# Patient Record
Sex: FEMALE | Race: WHITE | URBAN - METROPOLITAN AREA
[De-identification: names, ages, dates, MRNs, and addresses within clinical notes are randomized per-mention and may not be internally consistent; named-entity substitution may affect disease eponyms.]

---

## 2019-08-31 ENCOUNTER — OFFICE VISIT (OUTPATIENT)
Dept: URGENT CARE | Facility: URGENT CARE | Age: 69
End: 2019-08-31
Payer: COMMERCIAL

## 2019-08-31 VITALS
DIASTOLIC BLOOD PRESSURE: 76 MMHG | BODY MASS INDEX: 22.05 KG/M2 | TEMPERATURE: 98.6 F | SYSTOLIC BLOOD PRESSURE: 130 MMHG | HEIGHT: 70 IN | HEART RATE: 74 BPM | WEIGHT: 154 LBS

## 2019-08-31 DIAGNOSIS — A69.20 ERYTHEMA MIGRANS (LYME DISEASE): Primary | ICD-10-CM

## 2019-08-31 PROCEDURE — 99203 OFFICE O/P NEW LOW 30 MIN: CPT | Performed by: FAMILY MEDICINE

## 2019-08-31 RX ORDER — DOXYCYCLINE HYCLATE 100 MG
TABLET ORAL
Qty: 2 TABLET | Refills: 0 | Status: SHIPPED | OUTPATIENT
Start: 2019-08-31

## 2019-08-31 RX ORDER — PHENYTOIN SODIUM 100 MG/1
CAPSULE, EXTENDED RELEASE ORAL
COMMUNITY
Start: 2019-06-03

## 2019-08-31 ASSESSMENT — MIFFLIN-ST. JEOR: SCORE: 1303.79

## 2019-08-31 NOTE — PATIENT INSTRUCTIONS
"  Patient Education     Tick Bites  Ticks are small arachnids that feed on the blood of rodents, rabbits, birds, deer, dogs, and people. A tick bite may cause a reaction like a spider bite. You may have redness, itching, and slight swelling at the site. Sometimes you may have no reaction where the tick bit you.  Ticks may gorge themselves for days before you find and remove them. The bites themselves aren't cause for concern. But ticks can carry and pass on illnesses such as Lyme disease and [place] spotted fever. Both diseases begin with a rash and symptoms similar to the flu. In advanced stages, these diseases can be quite serious.     A \"bull's eye\" rash is a common symptom of Lyme disease.   When to go to the emergency room (ER)  Not all ticks carry disease. And a tick must stay attached for at least 24 hours to infect you. If you find a tick, don't panic. Try to carefully remove it with tweezers. Grasp the tick near its head and pull without twisting. If you can't easily dislodge the tick or if you leave the head in your skin, get medical care right away.  What to expect in the ER    The tick or any parts of the tick will be removed and the bite will be cleaned.    To prevent disease, you may be given antibiotics. Both Lyme disease and [place] spotted fever respond quickly to these medicines.    You may be asked to see your healthcare provider for a blood test to check for Lyme disease.  Follow-up care  Some states and counties have services that test ticks for Lyme disease and other diseases. Check with your local officials to see if this service is available in your area.  If you remove a tick yourself, watch for signs of a tick-borne illness. Symptoms may show up within a few days or weeks after a bite. Call your healthcare provider if you notice any of the following:    Rash. The rash may spread outward in a ring from a hard white lump. Or it may move up your arms and legs to your " chest.    Chills and fever    Body aches and joint pain    Severe headache  Date Last Reviewed: 12/1/2016 2000-2018 [employer]. [address] [place] [address], [place] [address]. All rights reserved. This information is not intended as a substitute for professional medical care. Always follow your healthcare professional's instructions.           Patient Education     Preventing Lyme Disease  Ticks are small spider-like arachnids that feed on the blood of animals and humans. They can carry the bacteria that cause Lyme disease. The bacteria can pass to a person from a tick bite. (It is not passed from person to person.) Lyme disease can lead to serious health problems if it is not treated with antibiotics. The best way to prevent Lyme disease is to avoid being bitten by a tick.     The tick that carries Lyme disease is very small--about the size of a poppy seed.   Preventing tick bites  The disease is carried by the blacklegged tick, also called a  deer tick.  Young ticks called nymphs are the most likely to carry the bacteria. They are very small--about the size of a poppy seed. They can be found on deer, rodents, and birds. Often the animal brushes the tick onto leaves or other plants as it runs through the woods and then the tick lives in bushes, grasses, and dead leaves. The most active time of year for infected ticks varies by region of the country. In the [place] and [place], they are more active from April to September. In the [place], they may be more active from December to February. But you can still be bitten at other times of the year. Below are tips for protecting yourself from tick bites.  Protect your body    Wear clothes that protect you. Clothing can help protect you from tick bites. Wear long pants and long sleeves in outdoor areas where ticks may live. Tuck your shirt into your pants. Tuck pant legs into your socks. And wear light colors so you can more easily see ticks on your clothes.    Use  insect repellent. Spray insect repellent containing at least 20 percent DEET on your exposed skin. You can also use it on clothing, shoes, and camping gear. Avoid getting DEET on children s hands, mouth, or eyes. You can use products with permethrin on clothing, shoes, and camping gear. But do not spray permethrin on skin. It will cause a rash. Follow the directions on the package of the spray you use. For more information on bug sprays, visit the National Pesticide Information Center at http://npic.[facility].    Avoid tick-infested areas. Avoid brushing against grasses, bushes, and other plants. Avoid walking through dead leaves and other ground vegetation. Do not sit on fallen logs. And avoid areas with large numbers of deer and rodents.    Check yourself for ticks. After being outdoors, check your clothes and skin for ticks. Keep in mind that the ticks are about the size of a poppy seed. Use both a hand-held and a full-length mirror to view all of your skin. Pay special attention to areas with hair. Make sure to thoroughly check these areas:  ? Scalp  ? Behind the ears  ? Armpits  ? Belly button  ? Waist  ? Groin  ? Backs of the knees    Use the clothes dryer. Putting clothing or bedding into a clothes dryer for 1 hour at high heat has been shown to kill ticks.  Control ticks around your home    Create tick-free safety zones. Ticks that transmit Lyme disease live in ground vegetation. Ticks crawl onto people from shrubs and grasses in and around wooded areas. Cut bushes and other plants away from the deck or patio and any child play areas. Keep all grasses cut short. Remove dead leaves and other dead vegetation. Do not put children s play equipment near wooded areas. Put wood chips or gravel on the ground between lawns and wooded areas.    Use pesticides. You can apply them yourself or hire a pest control expert. States have different regulations about pesticides. Ask your local health department for  information.    Keep deer away. Deer often carry the ticks that can infect you with Lyme disease. Do not attempt to pet or feed deer. Ask your local [facility] about deer-resistant plants. Ask your local health department about deer control in your area.    Prevent ticks on pets. Pets can bring ticks into your home. Use tick control medicine as advised by your . Check your pet for ticks after it comes indoors. Pay special attention to the ears.    Ask about local tick-control methods. Some states have tick-control programs. Local health departments may be using methods that can help you control ticks at home.  If you have a tick  If you find a tick on your skin, do not panic. Most ticks don't carry Lyme disease. And the tick must be attached for 36 to 48 hours before it might infect you. If you find a tick on you, here s what to do:    If the tick is not yet attached to your skin, remove the tick with tweezers or a tissue. Flush it down the toilet. If you see a tick on your clothes, use a piece of tape to lift it off. Do not touch it with your bare hands.    If the tick is attached to your skin:  ? Carefully remove the tick with tweezers. If you don t have tweezers, use your fingers protected by a paper towel or a thin cloth. Grasp the tick as close to your skin as possible. Pull slowly and gently to remove the tick. The tick may not let go right away. Do not pull harder. Be patient and keep trying gently. Do not twist the head or body as you pull. Do not crush or squeeze the body. This can release the bacteria into your body. Never use a hot match, petroleum jelly, or other products to remove a tick. (If you can t remove the tick or if part of the tick remains in the skin, call your healthcare provider right away.)  ? Wash your skin with soap and water after you remove the tick. This will help ensure you remove any bacteria.  ? If you can, save the tick in a tightly sealed glass or plastic container.  Take it to your healthcare provider. He or she may be able to have someone identify if it is the type of tick that transmits Lyme.  ? Call your healthcare provider and describe the bite and the tick. You may be asked to come in for an exam. You may be tested for Lyme. You may also be prescribed antibiotics to help prevent infection.  ? Over the next month, watch for the symptoms below, especially a rash at the site of the bite.  Symptoms of Lyme disease  Call your healthcare provider if you develop any symptoms of Lyme disease, even if you don t remember being bitten. These include:    A round, red rash (called a bull s-eye rash)    Fever and chills    Tiredness or body aches    Headache or a stiff neck    Joint pain and swelling (arthritis), especially in large joints such as the knees   To learn more    Centers for Disease Control and Prevention: www.cdc.gov/lyme    American Lyme Disease Foundation: www.aldf.com  Date Last Reviewed: 11/1/2016 2000-2018 [employer]. [address] [place] [address], [place] [address]. All rights reserved. This information is not intended as a substitute for professional medical care. Always follow your healthcare professional's instructions.

## 2019-08-31 NOTE — PROGRESS NOTES
"SUBJECTIVE:   Donna Sabillon is a 68 year old female presenting with a chief complaint of   Chief Complaint   Patient presents with     Insect Bites     noted red circular bite on lt side of neck today       She is a new patient of [facility].    Rash    Onset of rash was 2 day(s) ago.   Course of illness is sudden onset.  Severity mild  Current and Associated symptoms: asymptomatic   Location of the rash: left neck, shoulder area.  Previous history of a similar rash? No  Recent exposure history: possible tick bite  Denies exposure to: none known  Associated symptoms include: nothing.  Treatment measures tried include: none  Patient reports she noticed the rash this morning.  Yesterday she was outside walking in wood,  And she felt something around her neck and she removed it without looking at it.  She thinks it might be a tick.  She thinks it might be attached for a few hours.    Otherwise, she has no previous history of Lyme disease.  Currently, denies any other symptoms.  She has no fever, no headache, no muscle no joint pain.  Denies nausea or vomiting.    Review of Systems   ROS: 10 point ROS neg other than the symptoms noted above in the HPI.  No past medical history on file.  No family history on file.  Current Outpatient Medications   Medication Sig Dispense Refill     doxycycline hyclate (VIBRA-TABS) 100 MG tablet 2 tab , at once, 2 tablet 0     phenytoin (DILANTIN) 100 MG capsule TAKE 2 CAPSULES BY MOUTH AT BEDTIME       Social History     Tobacco Use     Smoking status: Never Smoker     Smokeless tobacco: Never Used   Substance Use Topics     Alcohol use: Not on file       OBJECTIVE  /76 (Cuff Size: Adult Regular)   Pulse 74   Temp 98.6  F (37  C) (Oral)   Ht 1.778 m (5' 10\")   Wt 69.9 kg (154 lb)   BMI 22.10 kg/m      Physical Exam  Exam:  Constitutional: healthy, alert and no distress  Skin: excoriation - circular rash, with clear center, 2 cm by 3 cm ,   Psychiatric: mentation appears " "normal and affect normal/bright    Labs:  No results found for this or any previous visit (from the past 24 hour(s)).    X-Ray was not done.    ASSESSMENT:      ICD-10-CM    1. Erythema migrans (Lyme disease) A69.20 doxycycline hyclate (VIBRA-TABS) 100 MG tablet       discussed with patient is possible she could have erythema Migrane,  The rash does present this pathway.  She was not sure if it was a ticks that she picked and she was not sure how many hours.-.  I gave her 2 dose of doxycycline as a prophylaxis.    Discussed with her symptoms and possible symptoms of Lyme disease.  Advised if she started noticing any symptom that she could follow-up with her primary care physician and be screened for Lyme disease.    If not improving or if condition worsens, follow up with your Primary Care Provider    Zoila Watson MD.      Patient Instructions       Patient Education     Tick Bites  Ticks are small arachnids that feed on the blood of rodents, rabbits, birds, deer, dogs, and people. A tick bite may cause a reaction like a spider bite. You may have redness, itching, and slight swelling at the site. Sometimes you may have no reaction where the tick bit you.  Ticks may gorge themselves for days before you find and remove them. The bites themselves aren't cause for concern. But ticks can carry and pass on illnesses such as Lyme disease and [place] spotted fever. Both diseases begin with a rash and symptoms similar to the flu. In advanced stages, these diseases can be quite serious.     A \"bull's eye\" rash is a common symptom of Lyme disease.   When to go to the emergency room (ER)  Not all ticks carry disease. And a tick must stay attached for at least 24 hours to infect you. If you find a tick, don't panic. Try to carefully remove it with tweezers. Grasp the tick near its head and pull without twisting. If you can't easily dislodge the tick or if you leave the head in your skin, get medical care right " away.  What to expect in the ER    The tick or any parts of the tick will be removed and the bite will be cleaned.    To prevent disease, you may be given antibiotics. Both Lyme disease and [place] spotted fever respond quickly to these medicines.    You may be asked to see your healthcare provider for a blood test to check for Lyme disease.  Follow-up care  Some states and [facility] have services that test ticks for Lyme disease and other diseases. Check with your local officials to see if this service is available in your area.  If you remove a tick yourself, watch for signs of a tick-borne illness. Symptoms may show up within a few days or weeks after a bite. Call your healthcare provider if you notice any of the following:    Rash. The rash may spread outward in a ring from a hard white lump. Or it may move up your arms and legs to your chest.    Chills and fever    Body aches and joint pain    Severe headache  Date Last Reviewed: 12/1/2016 2000-2018 [employer]. [address] [place] [address]. All rights reserved. This information is not intended as a substitute for professional medical care. Always follow your healthcare professional's instructions.           Patient Education     Preventing Lyme Disease  Ticks are small spider-like arachnids that feed on the blood of animals and humans. They can carry the bacteria that cause Lyme disease. The bacteria can pass to a person from a tick bite. (It is not passed from person to person.) Lyme disease can lead to serious health problems if it is not treated with antibiotics. The best way to prevent Lyme disease is to avoid being bitten by a tick.     The tick that carries Lyme disease is very small--about the size of a poppy seed.   Preventing tick bites  The disease is carried by the blacklegged tick, also called a  deer tick.  Young ticks called nymphs are the most likely to carry the bacteria. They are very small--about the size  of a poppy seed. They can be found on deer, rodents, and birds. Often the animal brushes the tick onto leaves or other plants as it runs through the woods and then the tick lives in bushes, grasses, and dead leaves. The most active time of year for infected ticks varies by region of the country. In the [place] and [place], they are more active from April to September. In the [place], they may be more active from December to February. But you can still be bitten at other times of the year. Below are tips for protecting yourself from tick bites.  Protect your body    Wear clothes that protect you. Clothing can help protect you from tick bites. Wear long pants and long sleeves in outdoor areas where ticks may live. Tuck your shirt into your pants. Tuck pant legs into your socks. And wear light colors so you can more easily see ticks on your clothes.    Use insect repellent. Spray insect repellent containing at least 20 percent DEET on your exposed skin. You can also use it on clothing, shoes, and camping gear. Avoid getting DEET on children s hands, mouth, or eyes. You can use products with permethrin on clothing, shoes, and camping gear. But do not spray permethrin on skin. It will cause a rash. Follow the directions on the package of the spray you use. For more information on bug sprays, visit the National Pesticide Information Center at http://npic.[facility].edu.    Avoid tick-infested areas. Avoid brushing against grasses, bushes, and other plants. Avoid walking through dead leaves and other ground vegetation. Do not sit on fallen logs. And avoid areas with large numbers of deer and rodents.    Check yourself for ticks. After being outdoors, check your clothes and skin for ticks. Keep in mind that the ticks are about the size of a poppy seed. Use both a hand-held and a full-length mirror to view all of your skin. Pay special attention to areas with hair. Make sure to thoroughly check these areas:  ? Scalp  ? Behind the  ears  ? Armpits  ? Belly button  ? Waist  ? Groin  ? Backs of the knees    Use the clothes dryer. Putting clothing or bedding into a clothes dryer for 1 hour at high heat has been shown to kill ticks.  Control ticks around your home    Create tick-free safety zones. Ticks that transmit Lyme disease live in ground vegetation. Ticks crawl onto people from shrubs and grasses in and around wooded areas. Cut bushes and other plants away from the deck or patio and any child play areas. Keep all grasses cut short. Remove dead leaves and other dead vegetation. Do not put children s play equipment near wooded areas. Put wood chips or gravel on the ground between lawns and wooded areas.    Use pesticides. You can apply them yourself or hire a pest control expert. States have different regulations about pesticides. Ask your local health department for information.    Keep deer away. Deer often carry the ticks that can infect you with Lyme disease. Do not attempt to pet or feed deer. Ask your local [facility] center about deer-resistant plants. Ask your local health department about deer control in your area.    Prevent ticks on pets. Pets can bring ticks into your home. Use tick control medicine as advised by your . Check your pet for ticks after it comes indoors. Pay special attention to the ears.    Ask about local tick-control methods. Some states have tick-control programs. Local health departments may be using methods that can help you control ticks at home.  If you have a tick  If you find a tick on your skin, do not panic. Most ticks don't carry Lyme disease. And the tick must be attached for 36 to 48 hours before it might infect you. If you find a tick on you, here s what to do:    If the tick is not yet attached to your skin, remove the tick with tweezers or a tissue. Flush it down the toilet. If you see a tick on your clothes, use a piece of tape to lift it off. Do not touch it with your bare hands.    If the  tick is attached to your skin:  ? Carefully remove the tick with tweezers. If you don t have tweezers, use your fingers protected by a paper towel or a thin cloth. Grasp the tick as close to your skin as possible. Pull slowly and gently to remove the tick. The tick may not let go right away. Do not pull harder. Be patient and keep trying gently. Do not twist the head or body as you pull. Do not crush or squeeze the body. This can release the bacteria into your body. Never use a hot match, petroleum jelly, or other products to remove a tick. (If you can t remove the tick or if part of the tick remains in the skin, call your healthcare provider right away.)  ? Wash your skin with soap and water after you remove the tick. This will help ensure you remove any bacteria.  ? If you can, save the tick in a tightly sealed glass or plastic container. Take it to your healthcare provider. He or she may be able to have someone identify if it is the type of tick that transmits Lyme.  ? Call your healthcare provider and describe the bite and the tick. You may be asked to come in for an exam. You may be tested for Lyme. You may also be prescribed antibiotics to help prevent infection.  ? Over the next month, watch for the symptoms below, especially a rash at the site of the bite.  Symptoms of Lyme disease  Call your healthcare provider if you develop any symptoms of Lyme disease, even if you don t remember being bitten. These include:    A round, red rash (called a bull s-eye rash)    Fever and chills    Tiredness or body aches    Headache or a stiff neck    Joint pain and swelling (arthritis), especially in large joints such as the knees   To learn more    Centers for Disease Control and Prevention: www.cdc.gov/lyme    American Lyme Disease Foundation: www.aldf.com  Date Last Reviewed: 11/1/2016 2000-2018 [employer]. [address] [place] [address], [place] [address]. All rights reserved. This information is not intended  as a substitute for professional medical care. Always follow your healthcare professional's instructions.

## 2021-03-08 ENCOUNTER — IMMUNIZATION (OUTPATIENT)
Dept: NURSING | Facility: CLINIC | Age: 71
End: 2021-03-08
Payer: COMMERCIAL

## 2021-03-08 PROCEDURE — 91303 PR COVID VAC JANSSEN AD26 0.5ML: CPT

## 2021-03-08 PROCEDURE — 0031A PR COVID VAC JANSSEN AD26 0.5ML: CPT

## 2021-03-21 ENCOUNTER — HEALTH MAINTENANCE LETTER (OUTPATIENT)
Age: 71
End: 2021-03-21

## 2021-09-04 ENCOUNTER — HEALTH MAINTENANCE LETTER (OUTPATIENT)
Age: 71
End: 2021-09-04

## 2022-04-16 ENCOUNTER — HEALTH MAINTENANCE LETTER (OUTPATIENT)
Age: 72
End: 2022-04-16

## 2022-10-22 ENCOUNTER — HEALTH MAINTENANCE LETTER (OUTPATIENT)
Age: 72
End: 2022-10-22

## 2023-04-01 ENCOUNTER — HEALTH MAINTENANCE LETTER (OUTPATIENT)
Age: 73
End: 2023-04-01

## 2023-06-01 ENCOUNTER — HEALTH MAINTENANCE LETTER (OUTPATIENT)
Age: 73
End: 2023-06-01